# Patient Record
Sex: FEMALE | Employment: UNEMPLOYED | URBAN - METROPOLITAN AREA
[De-identification: names, ages, dates, MRNs, and addresses within clinical notes are randomized per-mention and may not be internally consistent; named-entity substitution may affect disease eponyms.]

---

## 2020-07-15 PROBLEM — H91.90 HEARING LOSS: Status: ACTIVE | Noted: 2020-07-15

## 2020-07-15 PROBLEM — H93.90 EAR LESION: Status: ACTIVE | Noted: 2020-07-15

## 2020-08-17 ENCOUNTER — OFFICE VISIT (OUTPATIENT)
Dept: ENT CLINIC | Age: 30
End: 2020-08-17
Payer: MEDICAID

## 2020-08-17 DIAGNOSIS — J31.0 CHRONIC RHINITIS: ICD-10-CM

## 2020-08-17 DIAGNOSIS — H72.92 PERFORATION OF TYMPANIC MEMBRANE, LEFT: ICD-10-CM

## 2020-08-17 DIAGNOSIS — H73.891 RETRACTION OF TYMPANIC MEMBRANE OF RIGHT EAR: ICD-10-CM

## 2020-08-17 DIAGNOSIS — H66.3X2 CHRONIC SUPPURATIVE OTITIS MEDIA OF LEFT EAR, UNSPECIFIED OTITIS MEDIA LOCATION: Primary | ICD-10-CM

## 2020-08-17 PROCEDURE — 99203 OFFICE O/P NEW LOW 30 MIN: CPT | Performed by: OTOLARYNGOLOGY

## 2020-08-17 RX ORDER — OFLOXACIN 3 MG/ML
5 SOLUTION AURICULAR (OTIC) DAILY
Qty: 5 ML | Refills: 0 | Status: SHIPPED | OUTPATIENT
Start: 2020-08-17 | End: 2020-08-31

## 2020-08-17 NOTE — PROGRESS NOTES
Subjective:    Charlotte Maddox   30 y.o.   1990     HPI     Location - leftt ear    Quality - chronic otitis,, hearing loss    Severity -  Moderate/severe    Duration - 4 years    Timing - chronic, ongoing    Context - pt with known  Chronic ear disease left ear for number of years; she was originally worked up in [facility], and was scheduled for surgery but postponed due to pregnancy; she has had multiple sets of tubes prior to this; states ear drops tend to hurt or make ear swell more    Modifying Features - none    Associated symptoms/signs - hearing loss      Review of Systems  Review of Systems   Constitutional: Negative for chills and fever. HENT: Positive for congestion, ear discharge, ear pain and hearing loss. Negative for nosebleeds and tinnitus. Eyes: Negative for blurred vision and double vision. Respiratory: Negative for cough, sputum production and shortness of breath. Cardiovascular: Negative for chest pain and palpitations. Gastrointestinal: Negative for heartburn, nausea and vomiting. Musculoskeletal: Negative for joint pain and neck pain. Skin: Negative. Neurological: Negative for dizziness, speech change, weakness and headaches. Endo/Heme/Allergies: Negative for environmental allergies. Does not bruise/bleed easily. Psychiatric/Behavioral: Negative for memory loss. The patient does not have insomnia. Past Medical History:   Diagnosis Date    Ear lesion 7/15/2020    Hearing loss 7/15/2020     Past Surgical History:   Procedure Laterality Date    HX APPENDECTOMY      HX CHOLECYSTECTOMY      HX MYRINGOTOMY      ear tubes multiple times    HX OTHER SURGICAL          HX TONSIL AND ADENOIDECTOMY        History reviewed. No pertinent family history.   Social History     Tobacco Use    Smoking status: Current Every Day Smoker     Packs/day: 0.50     Years: 11.00     Pack years: 5.50    Smokeless tobacco: Never Used   Substance Use Topics    Alcohol use: Not on file      Prior to Admission medications    Medication Sig Start Date End Date Taking? Authorizing Provider   ofloxacin (FLOXIN) 0.3 % otic solution Administer 5 Drops in left ear daily for 14 days. 8/17/20 8/31/20 Yes Jc Gustafson MD        Allergies   Allergen Reactions    Azithromycin Anaphylaxis    Sulfa (Sulfonamide Antibiotics) Anaphylaxis         Objective: There were no vitals taken for this visit. Physical Exam  Vitals signs reviewed. Constitutional:       General: She is awake. She is not in acute distress. Appearance: Normal appearance. She is well-groomed. She is obese. HENT:      Head: Normocephalic and atraumatic. Jaw: There is normal jaw occlusion. No trismus, tenderness or malocclusion. Salivary Glands: Right salivary gland is not diffusely enlarged or tender. Left salivary gland is not diffusely enlarged or tender. Right Ear: Ear canal and external ear normal. Decreased hearing noted. A middle ear effusion is present. Tympanic membrane is retracted. Left Ear: Ear canal and external ear normal. Decreased hearing noted. Tympanic membrane is perforated. Ears:      Arias exam findings: does not lateralize. Right Rinne: AC > BC. Left Rinne: AC > BC. Comments: Left ear mostly dry today     Nose: No nasal deformity, septal deviation or mucosal edema. Right Nostril: No epistaxis. Left Nostril: No epistaxis. Right Turbinates: Not enlarged, swollen or pale. Left Turbinates: Not enlarged, swollen or pale. Right Sinus: No maxillary sinus tenderness or frontal sinus tenderness. Left Sinus: No maxillary sinus tenderness or frontal sinus tenderness. Mouth/Throat:      Lips: Pink. No lesions. Mouth: Mucous membranes are moist. No oral lesions. Dentition: Normal dentition. No dental caries. Tongue: No lesions. Palate: No mass and lesions. Pharynx: Oropharynx is clear. Uvula midline.  No oropharyngeal exudate or posterior oropharyngeal erythema. Tonsils: No tonsillar exudate. 0 on the right. 0 on the left. Eyes:      General: Vision grossly intact. Extraocular Movements: Extraocular movements intact. Right eye: No nystagmus. Left eye: No nystagmus. Conjunctiva/sclera: Conjunctivae normal.      Pupils: Pupils are equal, round, and reactive to light. Neck:      Musculoskeletal: No edema or erythema. Thyroid: No thyroid mass, thyromegaly or thyroid tenderness. Trachea: Trachea and phonation normal. No tracheal tenderness or tracheal deviation. Cardiovascular:      Rate and Rhythm: Normal rate and regular rhythm. Pulmonary:      Effort: Pulmonary effort is normal. No respiratory distress. Breath sounds: No stridor. Musculoskeletal: Normal range of motion. General: No swelling or tenderness. Lymphadenopathy:      Cervical: No cervical adenopathy. Skin:     General: Skin is warm and dry. Findings: No lesion or rash. Neurological:      General: No focal deficit present. Mental Status: She is alert and oriented to person, place, and time. Mental status is at baseline. Cranial Nerves: Cranial nerves are intact. Coordination: Romberg sign negative. Gait: Gait is intact. Comments: Negative Hallpike   Psychiatric:         Mood and Affect: Mood normal.         Behavior: Behavior normal. Behavior is cooperative. Assessment/Plan:     Encounter Diagnoses   Name Primary?  Chronic suppurative otitis media of left ear, unspecified otitis media location Yes    Retraction of tympanic membrane of right ear     Perforation of tympanic membrane, left     Chronic rhinitis      Pt with bilateral chronic ear disease  Left ear is overall dry today but still should do drops for 2 weeks  Right ear retracted and intact. Needs audio/CT, likely surgery.   I recommend she proceed to see Dr. Giuseppe Oliver for otology evaluation. Orders Placed This Encounter    ofloxacin (FLOXIN) 0.3 % otic solution     Follow-up and Dispositions    · Return if symptoms worsen or fail to improve.

## 2020-08-17 NOTE — LETTER
8/17/20 Patient: Darling Stacy YOB: 1990 Date of Visit: 8/17/2020 Caden Burden MD 
[address] [address] VIA Facsimile: [phone] Dear Caden Burden MD, Thank you for referring Ms. Darling Stacy to [address] [place] [address], NOSE, THROAT AND ALLERGY [facility] for evaluation. My notes for this consultation are attached. If you have questions, please do not hesitate to call me. I look forward to following your patient along with you. Sincerely, Precious Pham MD